# Patient Record
Sex: MALE | Race: BLACK OR AFRICAN AMERICAN | NOT HISPANIC OR LATINO | URBAN - METROPOLITAN AREA
[De-identification: names, ages, dates, MRNs, and addresses within clinical notes are randomized per-mention and may not be internally consistent; named-entity substitution may affect disease eponyms.]

---

## 2019-03-12 ENCOUNTER — EMERGENCY (EMERGENCY)
Facility: HOSPITAL | Age: 59
LOS: 1 days | Discharge: HOME | End: 2019-03-12
Attending: EMERGENCY MEDICINE | Admitting: EMERGENCY MEDICINE
Payer: COMMERCIAL

## 2019-03-12 VITALS
WEIGHT: 161.6 LBS | HEART RATE: 97 BPM | RESPIRATION RATE: 18 BRPM | SYSTOLIC BLOOD PRESSURE: 143 MMHG | OXYGEN SATURATION: 99 % | DIASTOLIC BLOOD PRESSURE: 97 MMHG | TEMPERATURE: 98 F

## 2019-03-12 VITALS
TEMPERATURE: 98 F | DIASTOLIC BLOOD PRESSURE: 75 MMHG | HEART RATE: 92 BPM | RESPIRATION RATE: 18 BRPM | SYSTOLIC BLOOD PRESSURE: 108 MMHG | OXYGEN SATURATION: 98 %

## 2019-03-12 DIAGNOSIS — E11.9 TYPE 2 DIABETES MELLITUS WITHOUT COMPLICATIONS: ICD-10-CM

## 2019-03-12 DIAGNOSIS — R79.89 OTHER SPECIFIED ABNORMAL FINDINGS OF BLOOD CHEMISTRY: ICD-10-CM

## 2019-03-12 DIAGNOSIS — E78.5 HYPERLIPIDEMIA, UNSPECIFIED: ICD-10-CM

## 2019-03-12 DIAGNOSIS — I10 ESSENTIAL (PRIMARY) HYPERTENSION: ICD-10-CM

## 2019-03-12 DIAGNOSIS — Z79.899 OTHER LONG TERM (CURRENT) DRUG THERAPY: ICD-10-CM

## 2019-03-12 DIAGNOSIS — Z71.1 PERSON WITH FEARED HEALTH COMPLAINT IN WHOM NO DIAGNOSIS IS MADE: ICD-10-CM

## 2019-03-12 LAB
ALBUMIN SERPL ELPH-MCNC: 4.8 G/DL — SIGNIFICANT CHANGE UP (ref 3.3–5)
ALP SERPL-CCNC: 37 U/L — LOW (ref 40–120)
ALT FLD-CCNC: 40 U/L — SIGNIFICANT CHANGE UP (ref 10–45)
ANION GAP SERPL CALC-SCNC: 11 MMOL/L — SIGNIFICANT CHANGE UP (ref 5–17)
APPEARANCE UR: CLEAR — SIGNIFICANT CHANGE UP
AST SERPL-CCNC: 28 U/L — SIGNIFICANT CHANGE UP (ref 10–40)
BASOPHILS # BLD AUTO: 0.02 K/UL — SIGNIFICANT CHANGE UP (ref 0–0.2)
BASOPHILS NFR BLD AUTO: 0.3 % — SIGNIFICANT CHANGE UP (ref 0–2)
BILIRUB SERPL-MCNC: 0.6 MG/DL — SIGNIFICANT CHANGE UP (ref 0.2–1.2)
BILIRUB UR-MCNC: NEGATIVE — SIGNIFICANT CHANGE UP
BUN SERPL-MCNC: 11 MG/DL — SIGNIFICANT CHANGE UP (ref 7–23)
CALCIUM SERPL-MCNC: 10.2 MG/DL — SIGNIFICANT CHANGE UP (ref 8.4–10.5)
CHLORIDE SERPL-SCNC: 102 MMOL/L — SIGNIFICANT CHANGE UP (ref 96–108)
CO2 SERPL-SCNC: 25 MMOL/L — SIGNIFICANT CHANGE UP (ref 22–31)
COLOR SPEC: YELLOW — SIGNIFICANT CHANGE UP
CREAT ?TM UR-MCNC: 151 MG/DL — SIGNIFICANT CHANGE UP
CREAT SERPL-MCNC: 1.1 MG/DL — SIGNIFICANT CHANGE UP (ref 0.5–1.3)
DIFF PNL FLD: NEGATIVE — SIGNIFICANT CHANGE UP
EOSINOPHIL # BLD AUTO: 0 K/UL — SIGNIFICANT CHANGE UP (ref 0–0.5)
EOSINOPHIL NFR BLD AUTO: 0 % — SIGNIFICANT CHANGE UP (ref 0–6)
GLUCOSE SERPL-MCNC: 115 MG/DL — HIGH (ref 70–99)
GLUCOSE UR QL: >=1000
HCT VFR BLD CALC: 50.8 % — HIGH (ref 39–50)
HGB BLD-MCNC: 17.4 G/DL — HIGH (ref 13–17)
IMM GRANULOCYTES NFR BLD AUTO: 0.3 % — SIGNIFICANT CHANGE UP (ref 0–1.5)
KETONES UR-MCNC: 15 MG/DL
LEUKOCYTE ESTERASE UR-ACNC: NEGATIVE — SIGNIFICANT CHANGE UP
LYMPHOCYTES # BLD AUTO: 1.36 K/UL — SIGNIFICANT CHANGE UP (ref 1–3.3)
LYMPHOCYTES # BLD AUTO: 17.5 % — SIGNIFICANT CHANGE UP (ref 13–44)
MAGNESIUM SERPL-MCNC: 2.3 MG/DL — SIGNIFICANT CHANGE UP (ref 1.6–2.6)
MCHC RBC-ENTMCNC: 30.5 PG — SIGNIFICANT CHANGE UP (ref 27–34)
MCHC RBC-ENTMCNC: 34.3 GM/DL — SIGNIFICANT CHANGE UP (ref 32–36)
MCV RBC AUTO: 89 FL — SIGNIFICANT CHANGE UP (ref 80–100)
MONOCYTES # BLD AUTO: 0.5 K/UL — SIGNIFICANT CHANGE UP (ref 0–0.9)
MONOCYTES NFR BLD AUTO: 6.5 % — SIGNIFICANT CHANGE UP (ref 2–14)
NEUTROPHILS # BLD AUTO: 5.85 K/UL — SIGNIFICANT CHANGE UP (ref 1.8–7.4)
NEUTROPHILS NFR BLD AUTO: 75.4 % — SIGNIFICANT CHANGE UP (ref 43–77)
NITRITE UR-MCNC: NEGATIVE — SIGNIFICANT CHANGE UP
NRBC # BLD: 0 /100 WBCS — SIGNIFICANT CHANGE UP (ref 0–0)
PH UR: 7 — SIGNIFICANT CHANGE UP (ref 5–8)
PLATELET # BLD AUTO: 168 K/UL — SIGNIFICANT CHANGE UP (ref 150–400)
POTASSIUM SERPL-MCNC: 4.7 MMOL/L — SIGNIFICANT CHANGE UP (ref 3.5–5.3)
POTASSIUM SERPL-SCNC: 4.7 MMOL/L — SIGNIFICANT CHANGE UP (ref 3.5–5.3)
PROT SERPL-MCNC: 8.3 G/DL — SIGNIFICANT CHANGE UP (ref 6–8.3)
PROT UR-MCNC: NEGATIVE MG/DL — SIGNIFICANT CHANGE UP
RBC # BLD: 5.71 M/UL — SIGNIFICANT CHANGE UP (ref 4.2–5.8)
RBC # FLD: 14.2 % — SIGNIFICANT CHANGE UP (ref 10.3–14.5)
SODIUM SERPL-SCNC: 138 MMOL/L — SIGNIFICANT CHANGE UP (ref 135–145)
SODIUM UR-SCNC: 75 MMOL/L — SIGNIFICANT CHANGE UP
SP GR SPEC: 1.02 — SIGNIFICANT CHANGE UP (ref 1–1.03)
UROBILINOGEN FLD QL: 0.2 E.U./DL — SIGNIFICANT CHANGE UP
WBC # BLD: 7.75 K/UL — SIGNIFICANT CHANGE UP (ref 3.8–10.5)
WBC # FLD AUTO: 7.75 K/UL — SIGNIFICANT CHANGE UP (ref 3.8–10.5)

## 2019-03-12 PROCEDURE — 84300 ASSAY OF URINE SODIUM: CPT

## 2019-03-12 PROCEDURE — 81003 URINALYSIS AUTO W/O SCOPE: CPT

## 2019-03-12 PROCEDURE — 85025 COMPLETE CBC W/AUTO DIFF WBC: CPT

## 2019-03-12 PROCEDURE — 99283 EMERGENCY DEPT VISIT LOW MDM: CPT | Mod: 25

## 2019-03-12 PROCEDURE — 83735 ASSAY OF MAGNESIUM: CPT

## 2019-03-12 PROCEDURE — 82570 ASSAY OF URINE CREATININE: CPT

## 2019-03-12 PROCEDURE — 80053 COMPREHEN METABOLIC PANEL: CPT

## 2019-03-12 PROCEDURE — 93010 ELECTROCARDIOGRAM REPORT: CPT

## 2019-03-12 PROCEDURE — 93005 ELECTROCARDIOGRAM TRACING: CPT

## 2019-03-12 RX ORDER — ATORVASTATIN CALCIUM 80 MG/1
0 TABLET, FILM COATED ORAL
Qty: 0 | Refills: 0 | COMMUNITY

## 2019-03-12 RX ORDER — AMLODIPINE BESYLATE 2.5 MG/1
0 TABLET ORAL
Qty: 0 | Refills: 0 | COMMUNITY

## 2019-03-12 RX ORDER — DAPAGLIFLOZIN 10 MG/1
0 TABLET, FILM COATED ORAL
Qty: 0 | Refills: 0 | COMMUNITY

## 2019-03-12 NOTE — ED ADULT NURSE NOTE - CHPI ED NUR SYMPTOMS NEG
no tingling/no pain/no dizziness/no chills/no fever/no nausea/no decreased eating/drinking/no vomiting/no weakness

## 2019-03-12 NOTE — ED ADULT NURSE NOTE - NSIMPLEMENTINTERV_GEN_ALL_ED
Implemented All Universal Safety Interventions:  [place] to call system. Call bell, personal items and telephone within reach. Instruct patient to call for assistance. Room bathroom lighting operational. Non-slip footwear when patient is off stretcher. Physically safe environment: no spills, clutter or unnecessary equipment. Stretcher in lowest position, wheels locked, appropriate side rails in place.

## 2019-03-12 NOTE — ED ADULT NURSE NOTE - OBJECTIVE STATEMENT
Pt w/ PMH of DM, HTN and HLD presents to ED today on referral of PMD for reported low lab values.  Pt denies dizziness, HA, numbness/paresthesias, LOC, CP, SOB or tremors.  Pt ECG shows NSR.  Pt is on CCM pending labs.

## 2019-03-12 NOTE — ED PROVIDER NOTE - CLINICAL SUMMARY MEDICAL DECISION MAKING FREE TEXT BOX
sent in for [facility] outpatient labs with Na <115, K<2, Cl<80. pt asymptomatic completely, labs were routine for his DM. repeat here normal. updated his pcp dr maharaj and dr davalos

## 2019-03-12 NOTE — ED ADULT TRIAGE NOTE - OTHER COMPLAINTS
as per labs k <2 and  . patient denies any complaints. sent in to be admitted to [facility] service.

## 2021-02-16 PROBLEM — I10 ESSENTIAL (PRIMARY) HYPERTENSION: Chronic | Status: ACTIVE | Noted: 2019-03-12

## 2021-02-16 PROBLEM — E11.9 TYPE 2 DIABETES MELLITUS WITHOUT COMPLICATIONS: Chronic | Status: ACTIVE | Noted: 2019-03-12

## 2021-02-16 PROBLEM — E78.5 HYPERLIPIDEMIA, UNSPECIFIED: Chronic | Status: ACTIVE | Noted: 2019-03-12

## 2021-03-10 PROBLEM — Z00.00 ENCOUNTER FOR PREVENTIVE HEALTH EXAMINATION: Status: ACTIVE | Noted: 2021-03-10

## 2021-03-17 ENCOUNTER — APPOINTMENT (OUTPATIENT)
Dept: HEMATOLOGY ONCOLOGY | Facility: CLINIC | Age: 61
End: 2021-03-17
Payer: COMMERCIAL

## 2021-03-17 VITALS
WEIGHT: 156.13 LBS | SYSTOLIC BLOOD PRESSURE: 144 MMHG | DIASTOLIC BLOOD PRESSURE: 94 MMHG | TEMPERATURE: 97.3 F | HEIGHT: 64 IN | BODY MASS INDEX: 26.66 KG/M2 | HEART RATE: 104 BPM | OXYGEN SATURATION: 96 %

## 2021-03-17 DIAGNOSIS — Z80.6 FAMILY HISTORY OF LEUKEMIA: ICD-10-CM

## 2021-03-17 DIAGNOSIS — Z82.49 FAMILY HISTORY OF ISCHEMIC HEART DISEASE AND OTHER DISEASES OF THE CIRCULATORY SYSTEM: ICD-10-CM

## 2021-03-17 DIAGNOSIS — Z80.3 FAMILY HISTORY OF MALIGNANT NEOPLASM OF BREAST: ICD-10-CM

## 2021-03-17 DIAGNOSIS — Z83.3 FAMILY HISTORY OF DIABETES MELLITUS: ICD-10-CM

## 2021-03-17 PROCEDURE — 99072 ADDL SUPL MATRL&STAF TM PHE: CPT

## 2021-03-17 PROCEDURE — 99204 OFFICE O/P NEW MOD 45 MIN: CPT | Mod: 25

## 2021-03-17 NOTE — ASSESSMENT
[FreeTextEntry1] : We will do repeat blood work including JAK2 to rule out polycythemia vera.\par \par \par Patient referred to our endocrinologist Dr. Monica Dixon for better control of diabetes.\par \par \par Follow-up here in 2 months or sooner if needed.\par \par \par

## 2021-03-17 NOTE — HISTORY OF PRESENT ILLNESS
[de-identified] : 60 years old male with history of diabetes x10 years was found to have an elevated hemoglobin hematocrit... He has been seen by neurologist Dr. Vargas for tremor... Most recent blood work, reveals his diabetes to be poorly controlled with hemoglobin A1c of 8.6 g/dL... I therefore suspect the polycythemia is secondary to dehydration from poorly controlled diabetes.

## 2021-03-18 LAB
25(OH)D3 SERPL-MCNC: 26.9 NG/ML
ALBUMIN SERPL ELPH-MCNC: 4.3 G/DL
ALP BLD-CCNC: 48 U/L
ALT SERPL-CCNC: 38 U/L
ANION GAP SERPL CALC-SCNC: 11 MMOL/L
AST SERPL-CCNC: 23 U/L
BASOPHILS # BLD AUTO: 0.03 K/UL
BASOPHILS NFR BLD AUTO: 0.6 %
BILIRUB SERPL-MCNC: 0.3 MG/DL
BUN SERPL-MCNC: 15 MG/DL
CALCIUM SERPL-MCNC: 9.6 MG/DL
CHLORIDE SERPL-SCNC: 101 MMOL/L
CO2 SERPL-SCNC: 24 MMOL/L
CREAT SERPL-MCNC: 1.07 MG/DL
EOSINOPHIL # BLD AUTO: 0.01 K/UL
EOSINOPHIL NFR BLD AUTO: 0.2 %
GLUCOSE SERPL-MCNC: 287 MG/DL
HCT VFR BLD CALC: 51.3 %
HGB BLD-MCNC: 16.6 G/DL
IMM GRANULOCYTES NFR BLD AUTO: 0.2 %
LDH SERPL-CCNC: 165 U/L
LYMPHOCYTES # BLD AUTO: 1.12 K/UL
LYMPHOCYTES NFR BLD AUTO: 21.9 %
MAN DIFF?: NORMAL
MCHC RBC-ENTMCNC: 29 PG
MCHC RBC-ENTMCNC: 32.4 GM/DL
MCV RBC AUTO: 89.7 FL
MONOCYTES # BLD AUTO: 0.32 K/UL
MONOCYTES NFR BLD AUTO: 6.3 %
NEUTROPHILS # BLD AUTO: 3.62 K/UL
NEUTROPHILS NFR BLD AUTO: 70.8 %
PLATELET # BLD AUTO: 136 K/UL
POTASSIUM SERPL-SCNC: 4.3 MMOL/L
PROT SERPL-MCNC: 6.9 G/DL
RBC # BLD: 5.72 M/UL
RBC # FLD: 14.4 %
SODIUM SERPL-SCNC: 136 MMOL/L
TSH SERPL-ACNC: 1.91 UIU/ML
VIT B12 SERPL-MCNC: 1453 PG/ML
WBC # FLD AUTO: 5.11 K/UL

## 2021-03-22 LAB
ESTIMATED AVERAGE GLUCOSE: 206 MG/DL
HBA1C MFR BLD HPLC: 8.8 %

## 2021-03-26 ENCOUNTER — APPOINTMENT (OUTPATIENT)
Dept: ENDOCRINOLOGY | Facility: CLINIC | Age: 61
End: 2021-03-26
Payer: COMMERCIAL

## 2021-03-26 VITALS
BODY MASS INDEX: 26.46 KG/M2 | HEART RATE: 96 BPM | OXYGEN SATURATION: 96 % | TEMPERATURE: 96.5 F | HEIGHT: 64 IN | WEIGHT: 155 LBS | DIASTOLIC BLOOD PRESSURE: 93 MMHG | SYSTOLIC BLOOD PRESSURE: 149 MMHG

## 2021-03-26 PROCEDURE — 99072 ADDL SUPL MATRL&STAF TM PHE: CPT

## 2021-03-26 PROCEDURE — 99205 OFFICE O/P NEW HI 60 MIN: CPT

## 2021-03-26 RX ORDER — ALCOHOL ANTISEPTIC PADS
PADS, MEDICATED (EA) TOPICAL
Qty: 1 | Refills: 0 | Status: ACTIVE | COMMUNITY
Start: 2021-03-26 | End: 1900-01-01

## 2021-03-26 RX ORDER — BLOOD-GLUCOSE METER
W/DEVICE KIT MISCELLANEOUS
Qty: 1 | Refills: 0 | Status: ACTIVE | COMMUNITY
Start: 2021-03-26 | End: 1900-01-01

## 2021-03-26 NOTE — REVIEW OF SYSTEMS
[Depression] : depression [Stress] : stress [Fatigue] : no fatigue [Decreased Appetite] : appetite not decreased [Dysphagia] : no dysphagia [Dysphonia] : no dysphonia [Chest Pain] : no chest pain [Slow Heart Rate] : heart rate is not slow [Palpitations] : no palpitations [Fast Heart Rate] : heart rate is not fast [Shortness Of Breath] : no shortness of breath [Cough] : no cough [Nausea] : no nausea [Constipation] : no constipation [Vomiting] : no vomiting [Diarrhea] : no diarrhea [Headaches] : no headaches [Tremors] : no tremors [Cold Intolerance] : no cold intolerance [Heat Intolerance] : no heat intolerance [Easy Bruising] : no tendency for easy bruising

## 2021-03-26 NOTE — REASON FOR VISIT
[Initial Evaluation] : an initial evaluation [DM Type 2] : DM Type 2 [FreeTextEntry2] : Dr/. Audrey Fuentes

## 2021-03-26 NOTE — ASSESSMENT
[Diabetes Foot Care] : diabetes foot care [Long Term Vascular Complications] : long term vascular complications of diabetes [Carbohydrate Consistent Diet] : carbohydrate consistent diet [Importance of Diet and Exercise] : importance of diet and exercise to improve glycemic control, achieve weight loss and improve cardiovascular health [Exercise/Effect on Glucose] : exercise/effect on glucose [Self Monitoring of Blood Glucose] : self monitoring of blood glucose [Retinopathy Screening] : Patient was referred to ophthalmology for retinopathy screening [Hypoglycemia Management] : hypoglycemia management [Action and use of Insulin] : action and use of short and long-acting insulin [Injection Technique, Storage, Sharps Disposal] : injection technique, storage, and sharps disposal [FreeTextEntry1] : Patient is a 59 yo man with uncontrolled type 2 diabetes\par \par 1. Type 2 diabetes\par -patient with A1c above goal at 8.8%.  Goal A1c is 7%\par -based on food recall, diet is reasonable. He has healthy carbs, does not eat fast food, denies having a sweet tooth and is normal weight.\par -being on three oral medicines with intolerance to metformin + a relatively healthy diet without being at goal A1c warrants small dose of basal insulin\par -start Lantus 15 units once a day\par -restart SMBG, glycemic goals are 100-140's\par -patient to call if sugars drop to < 70\par -continue with oral medicines.  Side effects of Farxiga including euglycemic acidosis and infections discussed, GI side effects of januvia and hypoglycemia with glipizide also discussed\par -significant amount of time spent discussing importance of behavioral modifications\par -healthy plate diet, consistent carbohydrate and sugar limited diet discussed\par -up to date with dilated eye exam\par -check microalbumin/creatinine today\par -monofilament testing normal\par \par 2. HLD\par -no lipid profile available\par -check lipid profile\par -continue statin\par \par 3. HTN\par -BP goal < 140/90\par -to be managed by PCP\par -cardiovascular risk factors were discussed including heart attack and stroke. Currently has no chest pain but if he experiences chest pain/shoulder/arm pain, he must seek further medical care\par \par 4. Right thyroid nodule\par -thyroid US referral provided\par \par Follow up in 2-3 months. Call with hypo/hyperglycemic excursions

## 2021-03-26 NOTE — HISTORY OF PRESENT ILLNESS
[FreeTextEntry1] : Patient is a 61 yo man with uncontrolled type 2 diabetes, polycythemia here for endcorine consultation\par \par Type 2 diabetes diagnosed about 2010.  States he needs to change diet drastically and started to do that recently.  PCP was managing the diabetes, never had an endocrinologist before.\par Current medications: januvia 100 mg, glipizide 10 mg and farxiga 10 mg. States adherence to medicines. Farxiga is the "newest" agent and has been on it for about 5 years.\par Was previously on metformin but had an allergic-like reaction.  Metformin was stopped 6 years ago.\par Has a glucometer, knows how to check but does not check regularly.  Started to track about 1 year ago but stopped\par Breakfast: whole wheat bread, 1 banana; coffee with milk no sugar\par Lunch: salad from [employer], slice of bread.  Used to eat plantain but stopped.  Denies eating fast food, home cooked meals\par Dinner 5-6 PM: quinoa, couscous, beans, chicken, broccoli\par Sweet tooth: denies having a sweet tooth but has one fruit daily\par Does not drink juice and no soda. If he drinks juice, it is home made from fresh fruit\par Dilated eye exam: 6 months ago, pressure was high\par Serum A1c 8.8% (March 22, 2021)\par \par HLD: on statin\par \par HTN: Denies having BP in the past but was told recently pressure was high\par

## 2021-03-26 NOTE — PHYSICAL EXAM
[Alert] : alert [Well Nourished] : well nourished [No Acute Distress] : no acute distress [EOMI] : extra ocular movement intact [Normal Hearing] : hearing was normal [Thyroid Not Enlarged] : the thyroid was not enlarged [Normal S1, S2] : normal S1 and S2 [Normal Rate] : heart rate was normal [Normal Bowel Sounds] : normal bowel sounds [Soft] : abdomen soft [Healthy Appearance] : healthy appearance [No Respiratory Distress] : no respiratory distress [No Accessory Muscle Use] : no accessory muscle use [Clear to Auscultation] : lungs were clear to auscultation bilaterally [Normal Gait] : normal gait [Normal Strength/Tone] : muscle strength and tone were normal [No Rash] : no rash [Foot Ulcers] : no foot ulcers [Right Foot Was Examined] : right foot ~C was examined [Left Foot Was Examined] : left foot ~C was examined [Normal] : normal [Full ROM] : with full range of motion [Swelling] : not swollen [Tenderness] : not tender [Erythema] : not erythematous [2+] : 2+ in the dorsalis pedis [#1 Diminished] : number 1 was normal [#2 Diminished] : number 2 was normal [#3 Diminished] : number 3 was normal [#4 Diminished] : number 4 was normal [#5 Diminished] : number 5 was normal [#6 Diminished] : number 6 was normal [#7 Diminished] : number 7 was normal [#8 Diminished] : number 8 was normal [#9 Diminished] : number 9 was normal [#10 Diminished] : number 10 was normal [No Motor Deficits] : the motor exam was normal [Normal Reflexes] : deep tendon reflexes were 2+ and symmetric [No Tremors] : no tremors [Normal Affect] : the affect was normal [Normal Insight/Judgement] : insight and judgment were intact [Normal Mood] : the mood was normal [de-identified] : right thyroid nodule

## 2021-03-29 LAB
CHOLEST SERPL-MCNC: 143 MG/DL
CREAT SPEC-SCNC: 63 MG/DL
HDLC SERPL-MCNC: 36 MG/DL
JAK2RLX: NORMAL
LDLC SERPL CALC-MCNC: 90 MG/DL
MICROALBUMIN 24H UR DL<=1MG/L-MCNC: <1.2 MG/DL
MICROALBUMIN/CREAT 24H UR-RTO: NORMAL MG/G
NONHDLC SERPL-MCNC: 107 MG/DL
TRIGL SERPL-MCNC: 83 MG/DL

## 2021-04-21 ENCOUNTER — TRANSCRIPTION ENCOUNTER (OUTPATIENT)
Age: 61
End: 2021-04-21

## 2021-04-22 ENCOUNTER — APPOINTMENT (OUTPATIENT)
Dept: HEMATOLOGY ONCOLOGY | Facility: CLINIC | Age: 61
End: 2021-04-22
Payer: COMMERCIAL

## 2021-04-22 PROCEDURE — 99213 OFFICE O/P EST LOW 20 MIN: CPT | Mod: 95

## 2021-04-22 NOTE — ASSESSMENT
[FreeTextEntry1] : Patient was polycythemia secondary to dehydration... He is now working on getting his diabetes under better control, with our endocrinologist.\par \par I explained to patient that his polycythemia was secondary to dehydration not the primary process... He will follow-up with his new primary Dr. Gooden in May.\par \par Follow-up here as needed

## 2021-04-22 NOTE — CONSULT LETTER
[Dear  ___] : Dear  [unfilled], [Consult Letter:] : I had the pleasure of evaluating your patient, [unfilled]. [Please see my note below.] : Please see my note below. [Consult Closing:] : Thank you very much for allowing me to participate in the care of this patient.  If you have any questions, please do not hesitate to contact me. [Sincerely,] : Sincerely, [FreeTextEntry3] : Audrey Fuentes MD\par

## 2021-04-22 NOTE — HISTORY OF PRESENT ILLNESS
[Home] : at home, [unfilled] , at the time of the visit. [Other Location: e.g. Home (Enter Location, City,State)___] : at [unfilled] [Verbal consent obtained from patient] : the patient, [unfilled] [de-identified] : 60 years old male with history of diabetes x10 years was found to have an elevated hemoglobin hematocrit... He has been seen by neurologist Dr. Vargas for tremor... Most recent blood work, reveals his diabetes to be poorly controlled with hemoglobin A1c of 8.6 g/dL... I therefore suspect the polycythemia is secondary to dehydration from poorly controlled diabetes. [de-identified] : April 22, 2021 patient is seen in follow-up for polycythemia... His JAK2 was negative, and those results were communicated to patient... He is very happy with Dr. Dixon, and is working on getting his blood sugar under control

## 2021-05-05 ENCOUNTER — NON-APPOINTMENT (OUTPATIENT)
Age: 61
End: 2021-05-05

## 2021-05-12 ENCOUNTER — APPOINTMENT (OUTPATIENT)
Dept: INTERNAL MEDICINE | Facility: CLINIC | Age: 61
End: 2021-05-12
Payer: COMMERCIAL

## 2021-05-12 VITALS
HEART RATE: 77 BPM | WEIGHT: 150 LBS | SYSTOLIC BLOOD PRESSURE: 130 MMHG | BODY MASS INDEX: 25.61 KG/M2 | DIASTOLIC BLOOD PRESSURE: 80 MMHG | HEIGHT: 64.25 IN

## 2021-05-12 DIAGNOSIS — Z98.890 OTHER SPECIFIED POSTPROCEDURAL STATES: ICD-10-CM

## 2021-05-12 DIAGNOSIS — H53.9 UNSPECIFIED VISUAL DISTURBANCE: ICD-10-CM

## 2021-05-12 DIAGNOSIS — R25.3 FASCICULATION: ICD-10-CM

## 2021-05-12 DIAGNOSIS — D75.1 SECONDARY POLYCYTHEMIA: ICD-10-CM

## 2021-05-12 DIAGNOSIS — Z63.5 DISRUPTION OF FAMILY BY SEPARATION AND DIVORCE: ICD-10-CM

## 2021-05-12 PROCEDURE — 99072 ADDL SUPL MATRL&STAF TM PHE: CPT

## 2021-05-12 PROCEDURE — 99204 OFFICE O/P NEW MOD 45 MIN: CPT

## 2021-05-12 SDOH — SOCIAL STABILITY - SOCIAL INSECURITY: DISRUPTION OF FAMILY BY SEPARATION AND DIVORCE: Z63.5

## 2021-05-12 NOTE — PLAN
[FreeTextEntry1] : f/u with Endocrinology in 2 weeks and get labs then when on optimal period to assess diets effect on labs. Has already assisted in his weight loss and normalization of blood pressure. Asked that they add PSA to those bloods

## 2021-05-12 NOTE — PHYSICAL EXAM
[Well-Appearing] : well-appearing [No Respiratory Distress] : no respiratory distress  [Clear to Auscultation] : lungs were clear to auscultation bilaterally [Normal Rate] : normal rate  [Regular Rhythm] : with a regular rhythm [Normal S1, S2] : normal S1 and S2 [No Murmur] : no murmur heard [No Edema] : there was no peripheral edema [Soft] : abdomen soft [Non Tender] : non-tender [Non-distended] : non-distended [No HSM] : no HSM [Normal Bowel Sounds] : normal bowel sounds [No Hernias] : no hernias [No Spinal Tenderness] : no spinal tenderness [Grossly Normal Strength/Tone] : grossly normal strength/tone [No Skin Lesions] : no skin lesions [Normal] : affect was normal and insight and judgment were intact [de-identified] : fit looking; well groomed [de-identified] : cloudy over right lens; left clear [de-identified] : palpable nodule to right of thyroid midline

## 2021-05-12 NOTE — HISTORY OF PRESENT ILLNESS
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 . His medical hx is detailed below. Notably, he was diagnosed with DM in 2013. Despite 3 oral meds, A1c remained elevated. Since his last lab test on 3.26.21, when his A1c was 8.8, he started a no animal/no animal product diet. He feels healthier and notes frequent urination in the past has decreased. The diet + exercise have resulted in a 6 lb weight loss in the last 6 weeks.

## 2021-05-27 ENCOUNTER — APPOINTMENT (OUTPATIENT)
Dept: ENDOCRINOLOGY | Facility: CLINIC | Age: 61
End: 2021-05-27
Payer: COMMERCIAL

## 2021-05-27 VITALS
BODY MASS INDEX: 25.27 KG/M2 | DIASTOLIC BLOOD PRESSURE: 90 MMHG | HEART RATE: 98 BPM | TEMPERATURE: 96.5 F | HEIGHT: 64 IN | OXYGEN SATURATION: 97 % | SYSTOLIC BLOOD PRESSURE: 131 MMHG | WEIGHT: 148 LBS

## 2021-05-27 LAB — HBA1C MFR BLD HPLC: 7.4

## 2021-05-27 PROCEDURE — 99214 OFFICE O/P EST MOD 30 MIN: CPT | Mod: 25

## 2021-05-27 PROCEDURE — 83036 HEMOGLOBIN GLYCOSYLATED A1C: CPT | Mod: QW

## 2021-06-21 ENCOUNTER — TRANSCRIPTION ENCOUNTER (OUTPATIENT)
Age: 61
End: 2021-06-21

## 2021-06-21 RX ORDER — BLOOD-GLUCOSE METER
70 EACH MISCELLANEOUS
Qty: 1 | Refills: 3 | Status: ACTIVE | COMMUNITY
Start: 2021-03-26 | End: 1900-01-01

## 2021-06-22 RX ORDER — DAPAGLIFLOZIN 10 MG/1
10 TABLET, FILM COATED ORAL DAILY
Qty: 90 | Refills: 3 | Status: ACTIVE | COMMUNITY
Start: 1900-01-01 | End: 1900-01-01

## 2021-07-01 ENCOUNTER — RX RENEWAL (OUTPATIENT)
Age: 61
End: 2021-07-01

## 2021-08-05 ENCOUNTER — TRANSCRIPTION ENCOUNTER (OUTPATIENT)
Age: 61
End: 2021-08-05

## 2021-08-19 ENCOUNTER — APPOINTMENT (OUTPATIENT)
Dept: ENDOCRINOLOGY | Facility: CLINIC | Age: 61
End: 2021-08-19
Payer: COMMERCIAL

## 2021-08-19 VITALS
BODY MASS INDEX: 25.95 KG/M2 | WEIGHT: 152 LBS | HEIGHT: 64 IN | DIASTOLIC BLOOD PRESSURE: 95 MMHG | SYSTOLIC BLOOD PRESSURE: 135 MMHG | TEMPERATURE: 97.2 F | OXYGEN SATURATION: 98 % | HEART RATE: 91 BPM

## 2021-08-19 LAB — HBA1C MFR BLD HPLC: 7.7

## 2021-08-19 PROCEDURE — 99214 OFFICE O/P EST MOD 30 MIN: CPT | Mod: 25

## 2021-08-19 PROCEDURE — 83036 HEMOGLOBIN GLYCOSYLATED A1C: CPT | Mod: QW

## 2021-08-19 RX ORDER — INSULIN GLARGINE 100 [IU]/ML
100 INJECTION, SOLUTION SUBCUTANEOUS
Qty: 2 | Refills: 3 | Status: DISCONTINUED | COMMUNITY
Start: 2021-03-26 | End: 2021-08-19

## 2021-08-19 NOTE — ASSESSMENT
[Long Term Vascular Complications] : long term vascular complications of diabetes [Carbohydrate Consistent Diet] : carbohydrate consistent diet [Importance of Diet and Exercise] : importance of diet and exercise to improve glycemic control, achieve weight loss and improve cardiovascular health [Exercise/Effect on Glucose] : exercise/effect on glucose [Hypoglycemia Management] : hypoglycemia management [Action and use of Insulin] : action and use of short and long-acting insulin [Self Monitoring of Blood Glucose] : self monitoring of blood glucose [Injection Technique, Storage, Sharps Disposal] : injection technique, storage, and sharps disposal [Retinopathy Screening] : Patient was referred to ophthalmology for retinopathy screening [FreeTextEntry1] : Patient is a 62 yo man with type 2 diabetes, bilateral thyroid nodules, HTN and HLD here for follow up\par \par 1. Type 2 diabetes\par -POCT A1c today is 7.4% which is a nice improvement from the last visit. \par -he never started the basal insulin\par -currently on 3 oral medicines, farxiga, glipizide and januvia.  Tolerating all medicines without reported side effects\par -up to date with dilated eye exam\par -continue with consistent carbohydrate, sugar limited diet\par -A1c goal 7%\par -if frequent hypoglycemia to < 70, decrease glipizide. Patient will call.\par \par 2. Thyroid nodule\par -right dominant nodule is 1.9 center\par -no baseline thyroid US for comaprison\par -repeat thyroid US Winter 2021 and if nodules increase in size may meet criteria for USGFNA\par -chemically euthyroid\par -no compressive symptoms\par \par 3. HTN\par -BP goal < 140/90\par \par 4. HLD\par -LDL 90 March 2021\par -on statin\par \par Follow up in 3-4 months

## 2021-08-19 NOTE — PHYSICAL EXAM
[Alert] : alert [Well Nourished] : well nourished [Healthy Appearance] : healthy appearance [No Acute Distress] : no acute distress [EOMI] : extra ocular movement intact [Normal Hearing] : hearing was normal [Thyroid Not Enlarged] : the thyroid was not enlarged [No Respiratory Distress] : no respiratory distress [No Accessory Muscle Use] : no accessory muscle use [Clear to Auscultation] : lungs were clear to auscultation bilaterally [Normal S1, S2] : normal S1 and S2 [Normal Rate] : heart rate was normal [Normal Bowel Sounds] : normal bowel sounds [Soft] : abdomen soft [Normal Gait] : normal gait [Normal Strength/Tone] : muscle strength and tone were normal [No Rash] : no rash [Foot Ulcers] : no foot ulcers [Right Foot Was Examined] : right foot ~C was examined [Left Foot Was Examined] : left foot ~C was examined [Normal] : normal [Full ROM] : with full range of motion [Swelling] : not swollen [Tenderness] : not tender [Erythema] : not erythematous [2+] : 2+ in the dorsalis pedis [#1 Diminished] : number 1 was normal [#2 Diminished] : number 2 was normal [#3 Diminished] : number 3 was normal [#4 Diminished] : number 4 was normal [#5 Diminished] : number 5 was normal [#6 Diminished] : number 6 was normal [#7 Diminished] : number 7 was normal [#8 Diminished] : number 8 was normal [#9 Diminished] : number 9 was normal [#10 Diminished] : number 10 was normal [No Motor Deficits] : the motor exam was normal [Normal Reflexes] : deep tendon reflexes were 2+ and symmetric [No Tremors] : no tremors [Normal Affect] : the affect was normal [Normal Insight/Judgement] : insight and judgment were intact [Normal Mood] : the mood was normal [de-identified] : right thyroid nodule

## 2021-08-19 NOTE — PHYSICAL EXAM
[Alert] : alert [Well Nourished] : well nourished [Healthy Appearance] : healthy appearance [No Acute Distress] : no acute distress [EOMI] : extra ocular movement intact [Normal Hearing] : hearing was normal [Thyroid Not Enlarged] : the thyroid was not enlarged [No Respiratory Distress] : no respiratory distress [No Accessory Muscle Use] : no accessory muscle use [Clear to Auscultation] : lungs were clear to auscultation bilaterally [Normal S1, S2] : normal S1 and S2 [Normal Rate] : heart rate was normal [Normal Bowel Sounds] : normal bowel sounds [Soft] : abdomen soft [No Stigmata of Cushings Syndrome] : no stigmata of Cushings Syndrome [Normal Gait] : normal gait [Normal Strength/Tone] : muscle strength and tone were normal [No Rash] : no rash [No Motor Deficits] : the motor exam was normal [Normal Affect] : the affect was normal [Normal Mood] : the mood was normal [Normal Insight/Judgement] : insight and judgment were intact [Foot Ulcers] : no foot ulcers

## 2021-08-19 NOTE — HISTORY OF PRESENT ILLNESS
[FreeTextEntry1] : Patient is a 62 yo man with uncontrolled type 2 diabetes, thyroid nodule here for diabetes follow up\par \par Type 2 diabetes diagnosed about 2010. States he needs to change diet drastically and started to do that recently.\par Was previously on metformin but had an allergic-like reaction. Metformin was stopped 6 years ago.\par He was on januvia 100 mg, glipizide 10 mg and farxiga 10 mg but based on an A1c of 8.8%, lantus 15 units was recommended in March 2021 but he never started the insulin, opting to modify diet instead. \par Currently adheres to glipizide, farxiga and januvia \par SMBG every other day\par AM: 90-100s\par PM: 100-130s\par Had value of 67-79 3 weeks ago in the morning\par Since the last visit, patient changed to plant based diet\par Breakfast: whole wheat bread, 1 banana; coffee with milk no sugar\par Lunch: salad from [employer], slice of bread. Used to eat plantain but stopped. Beans, chick peas and beans\par Dinner 5-6 PM: quinoa, couscous, beans, chicken, broccoli\par Does not drink juice and no soda. If he drinks juice, it is home made from fresh fruit\par Reports that he has been snacking on a lot of cashews\par Attempts to adhere to a plant based diet\par Dilated eye exam: 1 month ago, pressure in the eyes has improved. \par May A1c was 7.4% which was improved from 8.8%\par \par Thyroid nodule\par Right spongiform nodules measuring about 1.9 cm; left subcentimeter nodule April 2021\par No compressive symptoms\par Chemically and clinically euthyroid\par \par HLD: on statin\par \par HTN: Denies having BP in the past but was told recently pressure was high

## 2021-08-19 NOTE — HISTORY OF PRESENT ILLNESS
[FreeTextEntry1] : Patient is a 60 yo man with uncontrolled type 2 diabetes, polycythemia here for diabetes follow up\par \par Type 2 diabetes diagnosed about 2010. States he needs to change diet drastically and started to do that recently. PCP was managing the diabetes, never had an endocrinologist before.\par He was on januvia 100 mg, glipizide 10 mg and farxiga 10 mg but based on an A1c of 8.8%, lantus 15 units was recommended in March 2021 but he never started the  insulin, opting to modify diet instead. \par Was previously on metformin but had an allergic-like reaction. Metformin was stopped 6 years ago.\par Has a glucometer, knows how to check but does not check regularly. Patient states since last visit, he feels well.  No major issues. \par SMBG every other day\par AM: 90-100s\par PM: 100-130s\par Had value of 67-79 3 weeks ago in the morning\par Since the last visit, patient changed to plant based diet\par Breakfast: whole wheat bread, 1 banana; coffee with milk no sugar\par Lunch: salad from [employer], slice of bread. Used to eat plantain but stopped. Denies eating fast food, home cooked meals\par Dinner 5-6 PM: quinoa, couscous, beans, chicken, broccoli\par Sweet tooth: denies having a sweet tooth but has one fruit daily\par Does not drink juice and no soda. If he drinks juice, it is home made from fresh fruit\par Dilated eye exam: 6 months ago, pressure was high\par Serum A1c 8.8% (March 22, 2021)\par \par Thyroid nodule\par Right spongiform nodules measuring about 1.9 cm; left subcentimeter nodule\par No compressive symptoms\par Chemically and clinically euthyroid\par \par HLD: on statin\par \par HTN: Denies having BP in the past but was told recently pressure was high

## 2021-08-19 NOTE — REVIEW OF SYSTEMS
[Fatigue] : no fatigue [Decreased Appetite] : appetite not decreased [Visual Field Defect] : no visual field defect [Dysphagia] : no dysphagia [Neck Pain] : no neck pain [Dysphonia] : no dysphonia [Nasal Congestion] : no nasal congestion [Chest Pain] : no chest pain [Slow Heart Rate] : heart rate is not slow [Palpitations] : no palpitations [Nausea] : no nausea [Constipation] : no constipation [Vomiting] : no vomiting [Diarrhea] : no diarrhea

## 2021-08-19 NOTE — ASSESSMENT
[FreeTextEntry1] : Patient is a 60 yo man with type 2 diabetes, bilateral thyroid nodules, HTN and HLD here for follow up\par \par 1. Type 2 diabetes\par -POCT A1c today is 7.7% which is higher than previous visit and above goal\par -he believes the elevated A1c is due to extra snacking on nuts and will cut down\par -currently on 3 oral medicines, farxiga, glipizide and januvia. Tolerating all medicines without reported side effects. In order to obtain better glycemic control, will need additional medicines. He's already on three classes and is amenable to a trial of metformin. He has had some GI discomfort in the past with it but is open to retrial. Start with metformin 1000 mg daily, if no GI symptoms, increase to 1000 mg BID\par -up to date with dilated eye exam\par -continue with consistent carbohydrate, sugar limited diet\par -A1c goal 7%\par -if frequent hypoglycemia to < 70, decrease glipizide. Patient will call.\par \par 2. Thyroid nodule\par -right dominant nodule is 1.9 center\par -repeat thyroid US Winter 2021 and if nodules increase in size may meet criteria for USGFNA\par -chemically euthyroid\par -no compressive symptoms\par \par 3. HTN\par -BP goal < 140/90\par \par 4. HLD\par -LDL 90 March 2021\par -on statin\par \par Follow up in 3-4 months. \par

## 2021-08-30 ENCOUNTER — TRANSCRIPTION ENCOUNTER (OUTPATIENT)
Age: 61
End: 2021-08-30

## 2021-12-08 ENCOUNTER — APPOINTMENT (OUTPATIENT)
Dept: ENDOCRINOLOGY | Facility: CLINIC | Age: 61
End: 2021-12-08
Payer: COMMERCIAL

## 2021-12-08 VITALS
WEIGHT: 152 LBS | TEMPERATURE: 97.4 F | HEART RATE: 90 BPM | HEIGHT: 64 IN | BODY MASS INDEX: 25.95 KG/M2 | SYSTOLIC BLOOD PRESSURE: 152 MMHG | DIASTOLIC BLOOD PRESSURE: 96 MMHG

## 2021-12-08 VITALS — SYSTOLIC BLOOD PRESSURE: 137 MMHG | DIASTOLIC BLOOD PRESSURE: 97 MMHG

## 2021-12-08 PROCEDURE — 99214 OFFICE O/P EST MOD 30 MIN: CPT

## 2021-12-08 NOTE — REVIEW OF SYSTEMS
[Dysphagia] : no dysphagia [Dysphonia] : no dysphonia [Chest Pain] : no chest pain [Slow Heart Rate] : heart rate is not slow [Palpitations] : no palpitations [Fast Heart Rate] : heart rate is not fast [Shortness Of Breath] : no shortness of breath [Cough] : no cough [Nausea] : no nausea [Constipation] : no constipation [Vomiting] : no vomiting [Diarrhea] : no diarrhea [Headaches] : no headaches [Tremors] : no tremors [Depression] : no depression [Cold Intolerance] : no cold intolerance

## 2021-12-08 NOTE — PHYSICAL EXAM
[Alert] : alert [Well Nourished] : well nourished [No Acute Distress] : no acute distress [EOMI] : extra ocular movement intact [Normal Hearing] : hearing was normal [No Respiratory Distress] : no respiratory distress [No Accessory Muscle Use] : no accessory muscle use [Clear to Auscultation] : lungs were clear to auscultation bilaterally [Normal Bowel Sounds] : normal bowel sounds [Not Tender] : non-tender [Soft] : abdomen soft [No Motor Deficits] : the motor exam was normal [Normal Affect] : the affect was normal [Normal Insight/Judgement] : insight and judgment were intact [Normal Mood] : the mood was normal [Normal S1, S2] : normal S1 and S2 [Normal Rate] : heart rate was normal [Normal Gait] : normal gait [Normal Strength/Tone] : muscle strength and tone were normal [Foot Ulcers] : no foot ulcers

## 2021-12-08 NOTE — HISTORY OF PRESENT ILLNESS
[FreeTextEntry1] : Patient is a 60 yo man with uncontrolled type 2 diabetes, thyroid nodule here for diabetes follow up\par \par Type 2 diabetes diagnosed about 2010. States he needs to change diet drastically and started to do that recently.\par Was previously on metformin but had an allergic-like reaction. Metformin was stopped 6 years ago.\par He was on januvia 100 mg, glipizide 10 mg and farxiga 10 mg but based on an A1c of 8.8%, lantus 15 units was recommended in March 2021 but he never started the insulin, opting to modify diet instead. \par Currently adheres to glipizide, farxiga, januvia and metformin.  States he takes glipizide every other day and adheres to daily metformin\par SMBG every other day\par AM: 90-100s\par PM: 100s, highest value was 116\par No hypoglycemia\par Since the last visit, patient changed to plant based diet\par Breakfast: whole wheat bread, 1 banana; coffee with milk no sugar\par Lunch: salad from [employer], slice of bread. Used to eat plantain but stopped. Beans, chick peas and beans\par Dinner 5-6 PM: oatmeal\par Does not drink juice and no soda. If he drinks juice, it is home made from fresh fruit\par Reports that he has been snacking on a lot of cashews\par Attempts to adhere to a plant based diet\par Dilated eye exam: July 2021, no reported complications\par \par Thyroid nodule\par Right spongiform nodules measuring about 1.9 cm; left subcentimeter nodule April 2021\par No compressive symptoms\par Chemically and clinically euthyroid\par \par HLD: on statin\par \par HTN: Denies having BP in the past but was told recently pressure was high \par

## 2021-12-08 NOTE — ASSESSMENT
[FreeTextEntry1] : Patient is a 60 yo man with type 2 diabetes, bilateral thyroid nodules, HTN and HLD here for follow up\par \par 1. Type 2 diabetes\par -POCT A1c was 7.7% which is above goal.  Today he reports SMBG is around 90-100s, highest is 116, resolution of hypoglycemia\par -currently on 4 oral medicines, farxiga, glipizide, metformin and januvia. Tolerating all medicines without reported side effects. He has had some GI discomfort with metformin in the past but not reported at this time.\par -up to date with dilated eye exam\par -continue with consistent carbohydrate, sugar limited diet\par -A1c goal 7%\par -if frequent hypoglycemia to < 70, decrease glipizide. Patient will call.\par -check serum A1c\par -screen for nephropathy\par -check BMP\par \par 2. Thyroid nodule\par -right dominant nodule is 1.9 center\par -repeat thyroid US at this time, referral provided\par -chemically euthyroid\par -no compressive symptoms\par \par 3. HTN\par -BP goal < 140/90\par -BP elevated. He is going to his PCP Friday and will repeat the manual BP check.  He may require medicines for HTN that will be directed by PCP\par \par 4. HLD\par -LDL 90 March 2021\par -on statin\par \par Follow up in 3-4 months. \par

## 2021-12-09 LAB
ANION GAP SERPL CALC-SCNC: 11 MMOL/L
BUN SERPL-MCNC: 14 MG/DL
CALCIUM SERPL-MCNC: 10.4 MG/DL
CHLORIDE SERPL-SCNC: 103 MMOL/L
CHOLEST SERPL-MCNC: 135 MG/DL
CO2 SERPL-SCNC: 23 MMOL/L
CREAT SERPL-MCNC: 1.07 MG/DL
CREAT SPEC-SCNC: 51 MG/DL
ESTIMATED AVERAGE GLUCOSE: 166 MG/DL
GLUCOSE SERPL-MCNC: 144 MG/DL
HBA1C MFR BLD HPLC: 7.4 %
HDLC SERPL-MCNC: 31 MG/DL
LDLC SERPL CALC-MCNC: 66 MG/DL
MICROALBUMIN 24H UR DL<=1MG/L-MCNC: <1.2 MG/DL
MICROALBUMIN/CREAT 24H UR-RTO: NORMAL MG/G
NONHDLC SERPL-MCNC: 104 MG/DL
POTASSIUM SERPL-SCNC: 4.6 MMOL/L
SODIUM SERPL-SCNC: 137 MMOL/L
TRIGL SERPL-MCNC: 187 MG/DL

## 2021-12-27 ENCOUNTER — RX RENEWAL (OUTPATIENT)
Age: 61
End: 2021-12-27

## 2022-02-24 ENCOUNTER — OUTPATIENT (OUTPATIENT)
Dept: OUTPATIENT SERVICES | Facility: HOSPITAL | Age: 62
LOS: 1 days | End: 2022-02-24
Payer: COMMERCIAL

## 2022-02-24 ENCOUNTER — APPOINTMENT (OUTPATIENT)
Dept: ULTRASOUND IMAGING | Facility: HOSPITAL | Age: 62
End: 2022-02-24
Payer: COMMERCIAL

## 2022-02-24 PROCEDURE — 76536 US EXAM OF HEAD AND NECK: CPT

## 2022-02-24 PROCEDURE — 76536 US EXAM OF HEAD AND NECK: CPT | Mod: 26

## 2022-03-09 ENCOUNTER — NON-APPOINTMENT (OUTPATIENT)
Age: 62
End: 2022-03-09

## 2022-03-09 ENCOUNTER — APPOINTMENT (OUTPATIENT)
Dept: ENDOCRINOLOGY | Facility: CLINIC | Age: 62
End: 2022-03-09
Payer: COMMERCIAL

## 2022-03-09 VITALS
DIASTOLIC BLOOD PRESSURE: 89 MMHG | TEMPERATURE: 98.3 F | HEART RATE: 93 BPM | OXYGEN SATURATION: 98 % | WEIGHT: 150 LBS | SYSTOLIC BLOOD PRESSURE: 135 MMHG | HEIGHT: 65 IN | BODY MASS INDEX: 24.99 KG/M2

## 2022-03-09 LAB — HBA1C MFR BLD HPLC: 7.1

## 2022-03-09 PROCEDURE — 83036 HEMOGLOBIN GLYCOSYLATED A1C: CPT | Mod: QW

## 2022-03-09 PROCEDURE — 99214 OFFICE O/P EST MOD 30 MIN: CPT | Mod: 25

## 2022-03-09 NOTE — PHYSICAL EXAM
[Alert] : alert [Well Nourished] : well nourished [No Acute Distress] : no acute distress [EOMI] : extra ocular movement intact [Normal Hearing] : hearing was normal [No Respiratory Distress] : no respiratory distress [No Accessory Muscle Use] : no accessory muscle use [Clear to Auscultation] : lungs were clear to auscultation bilaterally [Normal S1, S2] : normal S1 and S2 [Normal Rate] : heart rate was normal [Normal Bowel Sounds] : normal bowel sounds [Not Tender] : non-tender [Soft] : abdomen soft [Normal Gait] : normal gait [Normal Strength/Tone] : muscle strength and tone were normal [Right foot was examined, including] : right foot ~C was examined, including visual inspection with sensory and pulse exams [Left foot was examined, including] : left foot ~C was examined, including visual inspection with sensory and pulse exams [No Motor Deficits] : the motor exam was normal [Normal Affect] : the affect was normal [Normal Insight/Judgement] : insight and judgment were intact [Normal Mood] : the mood was normal [Foot Ulcers] : no foot ulcers

## 2022-03-09 NOTE — HISTORY OF PRESENT ILLNESS
[FreeTextEntry1] : Patient is a 62 yo man with uncontrolled type 2 diabetes, thyroid nodule here for diabetes follow up\par \par Type 2 diabetes diagnosed about 2010. States he needs to change diet drastically and started to do that recently.\par Was previously on metformin but had an allergic-like reaction. Metformin was stopped 6 years ago.\par He was on januvia 100 mg, glipizide 10 mg and farxiga 10 mg but based on an A1c of 8.8%, lantus 15 units was recommended in March 2021 but he never started the insulin, opting to modify diet instead. \par Currently adheres to glipizide, farxiga, januvia and metformin 1000 mg BID. States he takes glipizide every other day and adheres to daily metformin\par SMBG every other day\par AM: 90-100s\par PM: 100s, highest value was 116\par No hypoglycemia\par Since the last visit, patient changed to plant based diet\par Breakfast: whole wheat bread, 1 banana; coffee with milk no sugar\par Lunch: salad from [employer], slice of bread. Used to eat plantain but stopped. Beans, chick peas and beans\par Dinner 5-6 PM: oatmeal\par Does not drink juice and no soda. If he drinks juice, it is home made from fresh fruit\par Reports that he has been snacking on a lot of cashews\par Dilated eye exam: July 2021, no reported complications\par \par Thyroid nodule\par Right spongiform nodules measuring about 1.9 cm; left subcentimeter nodule April 2021\par No compressive symptoms\par Chemically and clinically euthyroid\par Surveillance US completed in February 22 which confirms 1.7 cm nodule with ETE.  Referral for biopsy was provided last month. He knows to schedule but has not gotten around to it

## 2022-03-09 NOTE — REVIEW OF SYSTEMS
[Fatigue] : no fatigue [Decreased Appetite] : appetite not decreased [Visual Field Defect] : no visual field defect [Dysphonia] : no dysphonia [Dysphagia] : no dysphagia [Chest Pain] : no chest pain [Shortness Of Breath] : no shortness of breath [Nausea] : no nausea [Constipation] : no constipation [Vomiting] : no vomiting [Diarrhea] : no diarrhea [Headaches] : no headaches

## 2022-03-09 NOTE — ASSESSMENT
[Long Term Vascular Complications] : long term vascular complications of diabetes [Carbohydrate Consistent Diet] : carbohydrate consistent diet [Importance of Diet and Exercise] : importance of diet and exercise to improve glycemic control, achieve weight loss and improve cardiovascular health [Hypoglycemia Management] : hypoglycemia management [Self Monitoring of Blood Glucose] : self monitoring of blood glucose [Retinopathy Screening] : Patient was referred to ophthalmology for retinopathy screening [FreeTextEntry1] : Patient is a 60 yo man with type 2 diabetes, thyroid nodules, HTN and HLD here for follow up\par \par 1. Type 2 diabetes\par -POCT A1c today is 7.1% and at goal\par -Today he reports SMBG is around 90-100s, "mostly double digits."\par -no reported hypoglycemia\par -currently on 4 oral medicines, farxiga, glipizide, metformin and januvia. Tolerating all medicines without reported side effects. \par -up to date with dilated eye exam\par -continue with consistent carbohydrate, sugar limited diet\par -if frequent hypoglycemia to < 70, decrease glipizide. Patient will call.\par \par \par 2. Thyroid nodule\par -right dominant nodule is 1.9 center\par -biopsy of the right dominant nodule\par -chemically euthyroid\par -no compressive symptoms\par \par 3. HLD\par -statin\par \par Follow up in 4 months, sooner if needed\par

## 2022-03-24 ENCOUNTER — RX RENEWAL (OUTPATIENT)
Age: 62
End: 2022-03-24

## 2022-04-14 ENCOUNTER — RX RENEWAL (OUTPATIENT)
Age: 62
End: 2022-04-14

## 2022-06-24 RX ORDER — BLOOD SUGAR DIAGNOSTIC
STRIP MISCELLANEOUS
Qty: 3 | Refills: 3 | Status: ACTIVE | COMMUNITY
Start: 2021-07-01 | End: 1900-01-01

## 2022-06-29 ENCOUNTER — RX RENEWAL (OUTPATIENT)
Age: 62
End: 2022-06-29

## 2022-07-06 ENCOUNTER — APPOINTMENT (OUTPATIENT)
Dept: ENDOCRINOLOGY | Facility: CLINIC | Age: 62
End: 2022-07-06

## 2022-07-06 ENCOUNTER — RESULT REVIEW (OUTPATIENT)
Age: 62
End: 2022-07-06

## 2022-07-06 VITALS
BODY MASS INDEX: 24.66 KG/M2 | SYSTOLIC BLOOD PRESSURE: 128 MMHG | OXYGEN SATURATION: 99 % | HEART RATE: 86 BPM | DIASTOLIC BLOOD PRESSURE: 90 MMHG | HEIGHT: 65 IN | TEMPERATURE: 97.2 F | WEIGHT: 148 LBS

## 2022-07-06 LAB — HBA1C MFR BLD HPLC: 7.6

## 2022-07-06 PROCEDURE — 83036 HEMOGLOBIN GLYCOSYLATED A1C: CPT | Mod: QW

## 2022-07-06 PROCEDURE — 99214 OFFICE O/P EST MOD 30 MIN: CPT | Mod: 25

## 2022-07-06 RX ORDER — GLIPIZIDE 10 MG/1
10 TABLET, FILM COATED, EXTENDED RELEASE ORAL
Qty: 180 | Refills: 0 | Status: DISCONTINUED | COMMUNITY
Start: 2021-08-05 | End: 2022-07-06

## 2022-07-06 RX ORDER — GLIPIZIDE 10 MG/1
10 TABLET ORAL DAILY
Qty: 90 | Refills: 1 | Status: DISCONTINUED | COMMUNITY
End: 2022-07-06

## 2022-07-06 NOTE — REVIEW OF SYSTEMS
[Fatigue] : no fatigue [Decreased Appetite] : appetite not decreased [Dysphagia] : no dysphagia [Dysphonia] : no dysphonia [Nausea] : no nausea [Constipation] : no constipation [Vomiting] : no vomiting [Diarrhea] : no diarrhea [Polyuria] : no polyuria [Depression] : no depression [Anxiety] : no anxiety [Cold Intolerance] : no cold intolerance [Heat Intolerance] : no heat intolerance

## 2022-07-06 NOTE — ASSESSMENT
[FreeTextEntry1] : Patient is a 61 yo man with type 2 diabetes, thyroid nodules, HTN and HLD here for follow up\par \par 1. Type 2 diabetes\par -POCT A1c today is 7.6% and higher than our desired goal of 7%. He admits to increased carbohydrate intake including croissants, baked goods\par -Today he reports SMBG is around 90-100s, "mostly double digits."\par -reports one episode of hypoglycemia to 67\par -currently on 4 oral medicines with non adherence to januvia and glipizide.  Reports he consistently takes farxiga and metformin.  Denies significant side effects to medicines.  Due to hypoglycemia, can stop glipizide altogether. He was only take it about twice a week.  Recommend taking januvia 100 mg daily as opposed to once a week. \par -continue efforts to improve diet\par -up to date with dilated eye exam\par -continue with consistent carbohydrate, sugar limited diet\par -monofilament exam completed\par -check serum A1c, albumin/creatinine, BMP levels today\par \par 2. Thyroid nodule\par -right dominant nodule is 1.9 center\par -biopsy of the right dominant nodule; referrals have been provided in the past but he has yet to go.  Another referral provided today. Importance of diagnosis discussed\par -chemically euthyroid\par -no compressive symptoms\par \par 3. HLD\par -statin\par \par Follow up in 3 months, sooner if needed\par  [Diabetes Foot Care] : diabetes foot care [Long Term Vascular Complications] : long term vascular complications of diabetes [Carbohydrate Consistent Diet] : carbohydrate consistent diet [Importance of Diet and Exercise] : importance of diet and exercise to improve glycemic control, achieve weight loss and improve cardiovascular health [Exercise/Effect on Glucose] : exercise/effect on glucose [Hypoglycemia Management] : hypoglycemia management [Self Monitoring of Blood Glucose] : self monitoring of blood glucose [Retinopathy Screening] : Patient was referred to ophthalmology for retinopathy screening

## 2022-07-06 NOTE — PHYSICAL EXAM
[Alert] : alert [Well Nourished] : well nourished [No Acute Distress] : no acute distress [EOMI] : extra ocular movement intact [Normal Hearing] : hearing was normal [No Respiratory Distress] : no respiratory distress [No Accessory Muscle Use] : no accessory muscle use [Clear to Auscultation] : lungs were clear to auscultation bilaterally [Normal S1, S2] : normal S1 and S2 [Normal Rate] : heart rate was normal [Normal Bowel Sounds] : normal bowel sounds [Not Tender] : non-tender [Soft] : abdomen soft [Normal Gait] : normal gait [Normal Strength/Tone] : muscle strength and tone were normal [Right foot was examined, including] : right foot ~C was examined, including visual inspection with sensory and pulse exams [Left foot was examined, including] : left foot ~C was examined, including visual inspection with sensory and pulse exams [No Motor Deficits] : the motor exam was normal [Normal Affect] : the affect was normal [Normal Insight/Judgement] : insight and judgment were intact [Normal Mood] : the mood was normal [Foot Ulcers] : no foot ulcers [Swelling] : not swollen [Erythema] : not erythematous [Normal] : normal [2+] : 2+ in the dorsalis pedis [#1 Diminished] : number 1 was normal [#2 Diminished] : number 2 was normal [#3 Diminished] : number 3 was normal [#4 Diminished] : number 4 was normal [#5 Diminished] : number 5 was normal [#6 Diminished] : number 6 was normal [#7 Diminished] : number 7 was normal [#8 Diminished] : number 8 was normal [#9 Diminished] : number 9 was normal [#10 Diminished] : number 10 was normal

## 2022-07-06 NOTE — HISTORY OF PRESENT ILLNESS
[FreeTextEntry1] : Patient is a 63 yo man with uncontrolled type 2 diabetes, thyroid nodule here for diabetes follow up\par \par Type 2 diabetes diagnosed about 2010. States he needs to change diet drastically and started to do that earlier this year\par Was previously on metformin but had an allergic-like reaction. Metformin was stopped 6 years ago.\par He was on januvia 100 mg, glipizide 10 mg and farxiga 10 mg but based on an A1c of 8.8%, lantus 15 units was recommended in March 2021 but he never started the insulin, opting to modify diet instead. \par Currently adheres to glipizide, farxiga, januvia and metformin 1000 mg BID. Reports he mostly takes metformin and farixga.  Will take glipizide about twice a week and januvia about once a week\par SMBG every other day\par AM: 90-100s\par PM: 100s, highest value was 116\par Had value of 67 this week but previous to that not for 2-3 months.\par Breakfast: whole wheat bread, 1 banana; coffee with milk no sugar\par Lunch: salad from [employer], slice of bread. Used to eat plantain but stopped. Beans, chick peas and beans\par Dinner 5-6 PM: quinoa and vegetables\par Does not drink juice and no soda. If he drinks juice, it is home made from fresh fruit\par Reports that he has been snacking on a lot of cashews\par Dilated eye exam: June 2022, Dr Boby Mc in [facility] with no diabetes related complications\par \par Thyroid nodule\par Right spongiform nodules measuring about 1.9 cm; left subcentimeter nodule April 2021\par No compressive symptoms\par Chemically and clinically euthyroid\par Surveillance US completed in February 22 which confirms 1.7 cm nodule with ETE. Referral for biopsy was provided last month. He knows to schedule but has not gotten around to it \par Patient has yet to go for the biopsy

## 2022-07-07 LAB
ANION GAP SERPL CALC-SCNC: 12 MMOL/L
BUN SERPL-MCNC: 11 MG/DL
CALCIUM SERPL-MCNC: 9.6 MG/DL
CHLORIDE SERPL-SCNC: 102 MMOL/L
CHOLEST SERPL-MCNC: 137 MG/DL
CO2 SERPL-SCNC: 23 MMOL/L
CREAT SERPL-MCNC: 1.02 MG/DL
CREAT SPEC-SCNC: 137 MG/DL
EGFR: 84 ML/MIN/1.73M2
ESTIMATED AVERAGE GLUCOSE: 192 MG/DL
GLUCOSE SERPL-MCNC: 131 MG/DL
HBA1C MFR BLD HPLC: 8.3 %
HDLC SERPL-MCNC: 37 MG/DL
LDLC SERPL CALC-MCNC: 85 MG/DL
MICROALBUMIN 24H UR DL<=1MG/L-MCNC: <1.2 MG/DL
MICROALBUMIN/CREAT 24H UR-RTO: NORMAL MG/G
NONHDLC SERPL-MCNC: 100 MG/DL
POTASSIUM SERPL-SCNC: 4.5 MMOL/L
SODIUM SERPL-SCNC: 137 MMOL/L
T4 FREE SERPL-MCNC: 1.3 NG/DL
TRIGL SERPL-MCNC: 74 MG/DL
TSH SERPL-ACNC: 1.42 UIU/ML

## 2022-07-13 ENCOUNTER — OUTPATIENT (OUTPATIENT)
Dept: OUTPATIENT SERVICES | Facility: HOSPITAL | Age: 62
LOS: 1 days | End: 2022-07-13
Payer: COMMERCIAL

## 2022-07-13 ENCOUNTER — RX RENEWAL (OUTPATIENT)
Age: 62
End: 2022-07-13

## 2022-07-13 ENCOUNTER — APPOINTMENT (OUTPATIENT)
Dept: ULTRASOUND IMAGING | Facility: HOSPITAL | Age: 62
End: 2022-07-13

## 2022-07-13 ENCOUNTER — RESULT REVIEW (OUTPATIENT)
Age: 62
End: 2022-07-13

## 2022-07-13 PROCEDURE — 88173 CYTOPATH EVAL FNA REPORT: CPT

## 2022-07-13 PROCEDURE — 88173 CYTOPATH EVAL FNA REPORT: CPT | Mod: 26

## 2022-07-13 PROCEDURE — 10005 FNA BX W/US GDN 1ST LES: CPT

## 2022-07-13 PROCEDURE — 88305 TISSUE EXAM BY PATHOLOGIST: CPT

## 2022-07-13 PROCEDURE — 88305 TISSUE EXAM BY PATHOLOGIST: CPT | Mod: 26

## 2022-07-15 ENCOUNTER — NON-APPOINTMENT (OUTPATIENT)
Age: 62
End: 2022-07-15

## 2022-09-12 ENCOUNTER — RX RENEWAL (OUTPATIENT)
Age: 62
End: 2022-09-12

## 2022-10-11 ENCOUNTER — RX RENEWAL (OUTPATIENT)
Age: 62
End: 2022-10-11

## 2022-10-26 ENCOUNTER — APPOINTMENT (OUTPATIENT)
Dept: ENDOCRINOLOGY | Facility: CLINIC | Age: 62
End: 2022-10-26

## 2022-10-26 VITALS
TEMPERATURE: 97.1 F | BODY MASS INDEX: 25.49 KG/M2 | SYSTOLIC BLOOD PRESSURE: 142 MMHG | WEIGHT: 153 LBS | OXYGEN SATURATION: 97 % | HEIGHT: 65 IN | DIASTOLIC BLOOD PRESSURE: 92 MMHG | HEART RATE: 98 BPM

## 2022-10-26 LAB — HBA1C MFR BLD HPLC: 7.8

## 2022-10-26 PROCEDURE — 83036 HEMOGLOBIN GLYCOSYLATED A1C: CPT | Mod: QW

## 2022-10-26 PROCEDURE — 99215 OFFICE O/P EST HI 40 MIN: CPT | Mod: 25

## 2022-10-26 NOTE — REVIEW OF SYSTEMS
[Fatigue] : no fatigue [Decreased Appetite] : appetite not decreased [Visual Field Defect] : no visual field defect [Dysphagia] : no dysphagia [Dysphonia] : no dysphonia [Chest Pain] : no chest pain [Slow Heart Rate] : heart rate is not slow [Palpitations] : no palpitations [Fast Heart Rate] : heart rate is not fast [Shortness Of Breath] : no shortness of breath [Cough] : no cough [Headaches] : no headaches [Tremors] : no tremors [Depression] : no depression [Cold Intolerance] : no cold intolerance [Heat Intolerance] : no heat intolerance

## 2022-10-26 NOTE — HISTORY OF PRESENT ILLNESS
[FreeTextEntry1] : Patient is a 63 yo man with type 2 diabetes, thyroid nodule here for diabetes follow up\par \par Type 2 diabetes diagnosed around 2010.  Was previously on metformin but had an allergic-like reaction so it was stopped several years ago then restarted.\par He was on januvia 100 mg, glipizide 10 mg and farxiga 10 mg but based on an A1c of 8.8%, lantus 15 units was recommended in March 2021 but he never started the insulin, opting to modify diet instead. \par Reported non-adherence to oral medicines januvia and glipizide while consistently taking farxiga and metformin 1000 mg BID. A1c in July was 7.6% with reported non-adherence to consistent carbohydrate diet as well.  He was educated at that time to be consistent with medicines and glycemic goals were discussed\par At this time he is taking metformin 1000 mg BID, farxiga, januvia daily.  Takes glipizide every other day\par SMBG every other day\par Sometimes double digits and this morning value was 106\par Yesterday, he had a value of 67 around 8 pm.  Took glipizide at 3 PM after eating\par Diet: thinks there was a period of liberal diet but states that diet is somewhat better.  Cut back on rice, has whole wheat/rye not in excess.  No snacking on chips, no desserts. This morning he had a wheat muffin\par No soda and no juice\par Dilated eye exam: June 2022, Dr Boby Mc in [facility] with no diabetes related complications\par \par Thyroid nodule\par Right spongiform nodules measuring about 1.9 cm; left subcentimeter nodule April 2021\par No compressive symptoms\par Chemically and clinically euthyroid\par Surveillance US completed in February 22 confirmed 1.7 cm nodule with ETE.\par The nodule was biopsied July 2022 and was benign [place] Category II \par

## 2022-10-26 NOTE — ASSESSMENT
[FreeTextEntry1] : Patient is a 63 yo man with type 2 diabetes, thyroid nodule, HTN and HLD here for follow up\par \par 1. Type 2 diabetes\par -patient's POCT A1c today is 7.8%. While this value is better than the serum A1c from July of 8.3%, remains not at goal\par -patient ate a muffin today and when asked about food recall thinks chicken and nuts increase sugars without significant thought to carb/sugar.  I've offered him nutritional counseling but each time states he knows what to eat.  Does not bring meters with him to the visit\par -patient was offered insulin at prior visit but declines. He remains on four diabetes medicines/classes and escalation of therapy would be basal insulin\par -patient declined it, then towards the end of the visit states he might take it\par -educated that if he decides to take the insulin, he is to stop the glipizde and take levemir 10 units daily.  If he does not take the insulin, he can continue current regimen\par -risks of uncontrolled diabetes were discussed\par -encouraged the patient to check sugars more regularly at different times of the day and bring glucometer to next visit for review\par \par 2. Thyroid nodule\par -patient had biopsy of the right dominant nodule July 2022\par -the results were reviewed with the patient and embedded above\par -repeat thyroid US 2023\par \par 3. HTN\par -BP slightly above goal\par -follow up with PCP for optimization\par \par 4. HLD\par -statin\par \par Follow up in 3-4 months

## 2023-01-25 ENCOUNTER — APPOINTMENT (OUTPATIENT)
Dept: ENDOCRINOLOGY | Facility: CLINIC | Age: 63
End: 2023-01-25
Payer: COMMERCIAL

## 2023-01-25 VITALS
HEIGHT: 65 IN | SYSTOLIC BLOOD PRESSURE: 149 MMHG | DIASTOLIC BLOOD PRESSURE: 95 MMHG | OXYGEN SATURATION: 97 % | BODY MASS INDEX: 24.49 KG/M2 | TEMPERATURE: 97.2 F | WEIGHT: 147 LBS | HEART RATE: 107 BPM

## 2023-01-25 DIAGNOSIS — E78.5 HYPERLIPIDEMIA, UNSPECIFIED: ICD-10-CM

## 2023-01-25 DIAGNOSIS — I10 ESSENTIAL (PRIMARY) HYPERTENSION: ICD-10-CM

## 2023-01-25 LAB — HBA1C MFR BLD HPLC: 7.7

## 2023-01-25 PROCEDURE — 99214 OFFICE O/P EST MOD 30 MIN: CPT | Mod: 25

## 2023-01-25 PROCEDURE — 83036 HEMOGLOBIN GLYCOSYLATED A1C: CPT | Mod: QW

## 2023-01-25 RX ORDER — BLOOD-GLUCOSE METER
KIT MISCELLANEOUS
Qty: 1 | Refills: 3 | Status: ACTIVE | COMMUNITY
Start: 2021-03-26 | End: 1900-01-01

## 2023-01-25 RX ORDER — METFORMIN HYDROCHLORIDE 1000 MG/1
1000 TABLET, COATED ORAL TWICE DAILY
Qty: 180 | Refills: 0 | Status: DISCONTINUED | COMMUNITY
Start: 2021-08-19 | End: 2023-01-25

## 2023-01-25 RX ORDER — INSULIN DETEMIR 100 [IU]/ML
100 INJECTION, SOLUTION SUBCUTANEOUS
Qty: 4 | Refills: 0 | Status: DISCONTINUED | COMMUNITY
Start: 2022-10-26 | End: 2023-01-25

## 2023-01-25 NOTE — REVIEW OF SYSTEMS
[Fatigue] : no fatigue [Dysphagia] : no dysphagia [Dysphonia] : no dysphonia [Chest Pain] : no chest pain [Palpitations] : no palpitations [Nausea] : no nausea [Constipation] : no constipation [Diarrhea] : no diarrhea [Depression] : no depression

## 2023-01-25 NOTE — PHYSICAL EXAM
[Alert] : alert [Well Nourished] : well nourished [Healthy Appearance] : healthy appearance [No Acute Distress] : no acute distress [EOMI] : extra ocular movement intact [Normal Hearing] : hearing was normal [No Respiratory Distress] : no respiratory distress [No Accessory Muscle Use] : no accessory muscle use [Clear to Auscultation] : lungs were clear to auscultation bilaterally [Normal S1, S2] : normal S1 and S2 [Normal Rate] : heart rate was normal [Normal Bowel Sounds] : normal bowel sounds [Not Tender] : non-tender [Soft] : abdomen soft [Normal Gait] : normal gait [Normal Strength/Tone] : muscle strength and tone were normal [Foot Ulcers] : no foot ulcers [No Motor Deficits] : the motor exam was normal [Normal Affect] : the affect was normal [Normal Insight/Judgement] : insight and judgment were intact [Normal Mood] : the mood was normal

## 2023-01-25 NOTE — ASSESSMENT
[FreeTextEntry1] : Patient is a 63 yo man with type 2 diabetes, thyroid nodule, HTN and HLD here for follow up\par \par 1. Type 2 diabetes\par -patient's POCT A1c today is 7.7% and not significantly different from October. Goal A1c of 7% \par -since October, he reports improved adherence to medicine, less carbohydrates and consistent exercise\par -his glucometer was reviewed and he checks glucose levels once a day. Values are goal.  Educated that we may be missing post-prandial hyperglycemia. Encouraged the patient to check after eating\par -patient was offered insulin at prior visit but declines. Today, I think we can improve A1c with the addition of repaglinide before re-considering insulin as he has tightly controlled AM glucose levels\par -he needs to see a nutritionist. While he has declined in the past, this is essential to optimal diabetes management. He may be amenable to going so a referral was provided\par -risks of uncontrolled diabetes were discussed\par -add repaglinide 0.5 with meals as this in combination with sitagliptin may improve postprandial hyperglycemia. Continue with metformin 1000 mg BID and farxiga.  Actos was offered instead of repaglinide but he prefers repaglinide based on potential side effect profile\par \par 2. Thyroid nodule\par -patient had biopsy of the right dominant nodule July 2022\par -repeat thyroid US now; referral given\par \par 3. HTN\par -BP and HR remain elevated\par -encourage patient to follow up with PCP to address\par \par Follow up in 4 months\par

## 2023-01-26 LAB
ANION GAP SERPL CALC-SCNC: 17 MMOL/L
BUN SERPL-MCNC: 13 MG/DL
CALCIUM SERPL-MCNC: 10.3 MG/DL
CHLORIDE SERPL-SCNC: 99 MMOL/L
CHOLEST SERPL-MCNC: 160 MG/DL
CO2 SERPL-SCNC: 24 MMOL/L
CREAT SERPL-MCNC: 1.16 MG/DL
CREAT SPEC-SCNC: 108 MG/DL
EGFR: 72 ML/MIN/1.73M2
ESTIMATED AVERAGE GLUCOSE: 177 MG/DL
GLUCOSE SERPL-MCNC: 142 MG/DL
HBA1C MFR BLD HPLC: 7.8 %
HDLC SERPL-MCNC: 35 MG/DL
LDLC SERPL CALC-MCNC: 105 MG/DL
MICROALBUMIN 24H UR DL<=1MG/L-MCNC: 1.3 MG/DL
MICROALBUMIN/CREAT 24H UR-RTO: 12 MG/G
NONHDLC SERPL-MCNC: 125 MG/DL
POTASSIUM SERPL-SCNC: 4.7 MMOL/L
SODIUM SERPL-SCNC: 140 MMOL/L
T4 FREE SERPL-MCNC: 1.4 NG/DL
TRIGL SERPL-MCNC: 100 MG/DL
TSH SERPL-ACNC: 1.69 UIU/ML

## 2023-03-02 ENCOUNTER — OUTPATIENT (OUTPATIENT)
Dept: OUTPATIENT SERVICES | Facility: HOSPITAL | Age: 63
LOS: 1 days | End: 2023-03-02
Payer: COMMERCIAL

## 2023-03-02 ENCOUNTER — APPOINTMENT (OUTPATIENT)
Dept: ULTRASOUND IMAGING | Facility: HOSPITAL | Age: 63
End: 2023-03-02
Payer: COMMERCIAL

## 2023-03-02 ENCOUNTER — TRANSCRIPTION ENCOUNTER (OUTPATIENT)
Age: 63
End: 2023-03-02

## 2023-03-02 PROCEDURE — 76536 US EXAM OF HEAD AND NECK: CPT

## 2023-03-02 PROCEDURE — 76536 US EXAM OF HEAD AND NECK: CPT | Mod: 26

## 2023-03-02 RX ORDER — ATORVASTATIN CALCIUM 10 MG/1
10 TABLET, FILM COATED ORAL
Qty: 90 | Refills: 3 | Status: ACTIVE | COMMUNITY
Start: 2022-04-14 | End: 1900-01-01

## 2023-03-03 ENCOUNTER — NON-APPOINTMENT (OUTPATIENT)
Age: 63
End: 2023-03-03

## 2023-04-07 ENCOUNTER — RX RENEWAL (OUTPATIENT)
Age: 63
End: 2023-04-07

## 2023-05-19 ENCOUNTER — TRANSCRIPTION ENCOUNTER (OUTPATIENT)
Age: 63
End: 2023-05-19

## 2023-05-22 RX ORDER — LANCETS 33 GAUGE
EACH MISCELLANEOUS
Qty: 100 | Refills: 0 | Status: ACTIVE | COMMUNITY
Start: 2023-05-22 | End: 1900-01-01

## 2023-07-06 ENCOUNTER — APPOINTMENT (OUTPATIENT)
Dept: ENDOCRINOLOGY | Facility: CLINIC | Age: 63
End: 2023-07-06

## 2024-05-23 ENCOUNTER — APPOINTMENT (OUTPATIENT)
Dept: ENDOCRINOLOGY | Facility: CLINIC | Age: 64
End: 2024-05-23
Payer: COMMERCIAL

## 2024-05-23 DIAGNOSIS — E11.9 TYPE 2 DIABETES MELLITUS W/OUT COMPLICATIONS: ICD-10-CM

## 2024-05-23 DIAGNOSIS — E04.1 NONTOXIC SINGLE THYROID NODULE: ICD-10-CM

## 2024-05-23 PROCEDURE — 99214 OFFICE O/P EST MOD 30 MIN: CPT

## 2024-05-23 RX ORDER — SITAGLIPTIN 100 MG/1
100 TABLET, FILM COATED ORAL
Qty: 90 | Refills: 0 | Status: DISCONTINUED | COMMUNITY
Start: 2022-06-29 | End: 2024-05-23

## 2024-05-23 NOTE — HISTORY OF PRESENT ILLNESS
[FreeTextEntry1] : Patient is a 65 yo man with type 2 diabetes, thyroid nodule here for diabetes follow up. Last seen January 2022  Type 2 diabetes diagnosed around 2010. Was previously on metformin but had an allergic-like reaction so it was stopped several years ago then restarted. He was on januvia 100 mg, glipizide 10 mg and farxiga 10 mg but based on an A1c of 8.8%, lantus 15 units was recommended in March 2021. Adherence to medicine has been low.  He ran out of insulin about two months ago and did not request refills from PCP. He says insulin was as needed 180 will take lantus 3-4 units.  He discontinued januvia on his own At this time he is taking metformin 1000 mg BID, farxiga 10 mg daily SMBG every other day: "roller coaster." This morning glucose was 67 and yesterday he had a value of 136.  He states the low was due to not eating much yesterday.   He reports A1c two months ago was "8 point something."   Diet: he will eat vegetables, quinoa, avoids rice; will have chicken breast. No soda and no juice Dilated eye exam: 4 months ago, Dr Boby Mc in [facility] with no diabetes related complications  Thyroid nodule Right spongiform nodules measuring about 1.9 cm; left subcentimeter nodule April 2021 No compressive symptoms Chemically and clinically euthyroid Surveillance US completed in February 22 confirmed 1.7 cm nodule with ETE. The nodule was biopsied July 2022 and was benign [place] Category II

## 2024-05-23 NOTE — PHYSICAL EXAM
[Alert] : alert [No Acute Distress] : no acute distress [No Respiratory Distress] : no respiratory distress [Clear to Auscultation] : lungs were clear to auscultation bilaterally [Normal Rate] : heart rate was normal [Normal Bowel Sounds] : normal bowel sounds [Soft] : abdomen soft [Normal Affect] : the affect was normal [Normal Mood] : the mood was normal

## 2024-05-23 NOTE — ASSESSMENT
[Long Term Vascular Complications] : long term vascular complications of diabetes [Carbohydrate Consistent Diet] : carbohydrate consistent diet [Importance of Diet and Exercise] : importance of diet and exercise to improve glycemic control, achieve weight loss and improve cardiovascular health [Self Monitoring of Blood Glucose] : self monitoring of blood glucose [Retinopathy Screening] : Patient was referred to ophthalmology for retinopathy screening [FreeTextEntry1] : Patient is a 63 yo man with type 2 diabetes, thyroid nodule, HTN and HLD here for follow up  1. Type 2 diabetes -last visit was over 1 year ago. There are no MA"s at this time, patient arrived late to visit, unable to do POCT A1c. Recent labs were done by PCP, requested he fax it over.  A1c is "8 point something" - A1c of 7% -he reports adherence to pills but self discontinued januvia and ran out of lantus 2 months ago.  He was in [place]. -patient states diet is unchanged, focuses on vegetables, limits starches/carbohydrates -risks of uncontrolled diabetes were discussed -continue metformin and farxiga at current doses.  He was only taking lantus 3-4 units if glucose was > 180.  At those low doses, not needed to restart. We will add repaglinide 1 with meals  2. Thyroid nodule -patient had biopsy of the right dominant nodule July 2022 -repeat thyroid US referral given in the past but he has not completed imaging -new referral provided today  Follow up in 3 months

## 2024-06-19 RX ORDER — METFORMIN HYDROCHLORIDE 1000 MG/1
1000 TABLET, COATED ORAL TWICE DAILY
Qty: 1 | Refills: 3 | Status: DISCONTINUED | COMMUNITY
Start: 2023-01-25 | End: 2024-06-19

## 2024-06-19 RX ORDER — REPAGLINIDE 2 MG/1
2 TABLET ORAL 3 TIMES DAILY
Qty: 270 | Refills: 3 | Status: ACTIVE | COMMUNITY
Start: 2023-01-25 | End: 1900-01-01

## 2024-06-20 ENCOUNTER — TRANSCRIPTION ENCOUNTER (OUTPATIENT)
Age: 64
End: 2024-06-20

## 2024-07-08 ENCOUNTER — OUTPATIENT (OUTPATIENT)
Dept: OUTPATIENT SERVICES | Facility: HOSPITAL | Age: 64
LOS: 1 days | End: 2024-07-08

## 2024-07-08 ENCOUNTER — APPOINTMENT (OUTPATIENT)
Dept: ULTRASOUND IMAGING | Facility: HOSPITAL | Age: 64
End: 2024-07-08
Payer: COMMERCIAL

## 2024-07-08 PROCEDURE — 76536 US EXAM OF HEAD AND NECK: CPT

## 2024-07-08 PROCEDURE — 76536 US EXAM OF HEAD AND NECK: CPT | Mod: 26

## 2024-08-22 ENCOUNTER — APPOINTMENT (OUTPATIENT)
Dept: ENDOCRINOLOGY | Facility: CLINIC | Age: 64
End: 2024-08-22
Payer: COMMERCIAL

## 2024-08-22 VITALS
BODY MASS INDEX: 24.16 KG/M2 | SYSTOLIC BLOOD PRESSURE: 134 MMHG | TEMPERATURE: 97.2 F | HEART RATE: 82 BPM | OXYGEN SATURATION: 99 % | WEIGHT: 145 LBS | DIASTOLIC BLOOD PRESSURE: 89 MMHG | HEIGHT: 65 IN

## 2024-08-22 DIAGNOSIS — I10 ESSENTIAL (PRIMARY) HYPERTENSION: ICD-10-CM

## 2024-08-22 DIAGNOSIS — E04.1 NONTOXIC SINGLE THYROID NODULE: ICD-10-CM

## 2024-08-22 DIAGNOSIS — E11.9 TYPE 2 DIABETES MELLITUS W/OUT COMPLICATIONS: ICD-10-CM

## 2024-08-22 DIAGNOSIS — E78.5 HYPERLIPIDEMIA, UNSPECIFIED: ICD-10-CM

## 2024-08-22 LAB — HBA1C MFR BLD HPLC: 8.4

## 2024-08-22 PROCEDURE — 36415 COLL VENOUS BLD VENIPUNCTURE: CPT

## 2024-08-22 PROCEDURE — G2211 COMPLEX E/M VISIT ADD ON: CPT | Mod: NC

## 2024-08-22 PROCEDURE — 99214 OFFICE O/P EST MOD 30 MIN: CPT

## 2024-08-22 PROCEDURE — 83036 HEMOGLOBIN GLYCOSYLATED A1C: CPT | Mod: QW

## 2024-08-22 RX ORDER — SITAGLIPTIN AND METFORMIN HYDROCHLORIDE 50; 1000 MG/1; MG/1
50-1000 TABLET, FILM COATED, EXTENDED RELEASE ORAL
Qty: 180 | Refills: 3 | Status: ACTIVE | COMMUNITY
Start: 2024-08-22 | End: 1900-01-01

## 2024-08-22 NOTE — ASSESSMENT
[FreeTextEntry1] : Patient is a 65 yo man with type 2 diabetes, thyroid nodule, HTN and HLD here for follow up  1. Type 2 diabetes -POCT A1c today is 8.4% and remains above goal -he reports adherence to current diabetes medicines -on food recall, diet remains somewhat high in starches/carbohydrates.  Recommend green vegetables, lean protein, fruits in moderation -risks of uncontrolled diabetes were discussed -continue repaglinide 2 mg TID and farxiga at current doses. Stop the metformin and start Janumet XR  BID -dilated exam up to date -check CMP, serum A1c, albumin/creatinine levels today  2. Thyroid nodule -patient had biopsy of the right dominant nodule July 2022-benign -he is up to date with thyroid US for 2024 -requires annual surveillance -check TSH  3. HTN -BP stable  4. HLD -statin -check lipid proflie -LDL goal < 70  Patient is aware that I'm leaving the practice.  He will need new endocrinology care going forward.

## 2024-08-22 NOTE — REVIEW OF SYSTEMS
[Fatigue] : no fatigue [Chest Pain] : no chest pain [Slow Heart Rate] : heart rate is not slow [Palpitations] : no palpitations [Fast Heart Rate] : heart rate is not fast [Shortness Of Breath] : no shortness of breath [Cough] : no cough [Nausea] : no nausea [Constipation] : no constipation [Vomiting] : no vomiting [Diarrhea] : no diarrhea [FreeTextEntry2] : "I feel great"

## 2024-08-22 NOTE — PHYSICAL EXAM
[Alert] : alert [No Acute Distress] : no acute distress [No Respiratory Distress] : no respiratory distress [Clear to Auscultation] : lungs were clear to auscultation bilaterally [Normal S1, S2] : normal S1 and S2 [Normal Rate] : heart rate was normal [Normal Bowel Sounds] : normal bowel sounds [Not Tender] : non-tender [Normal Affect] : the affect was normal [Normal Mood] : the mood was normal [Normal Hearing] : hearing was normal [Normal Gait] : normal gait

## 2024-08-22 NOTE — HISTORY OF PRESENT ILLNESS
[FreeTextEntry1] : Patient is a 63 yo man with type 2 diabetes, thyroid nodule here for follow up.  Type 2 diabetes diagnosed around 2010. Was previously on metformin but had an allergic-like reaction so it was stopped several years ago then restarted. He was on januvia 100 mg, glipizide 10 mg and farxiga 10 mg but based on an A1c of 8.8%, lantus 15 units was recommended in March 2021. Adherence to medicine has been low. He ran out of insulin about two months ago and did not request refills from PCP. He says insulin was as needed 180 will take lantus 3-4 units. He discontinued januvia on his own At this time he is taking metformin 1000 mg BID, farxiga 10 mg daily. Repaglinide 1 mg TID with meals was added last visit May 2024 Checks sugars irregularly: reports values in the 90-130s.  This morning's glucose was 92.  Diet: quinoa, legumes, avoids red meat; eats chicken breast No soda and no juice Dilated eye exam: last week Dr Boby Mc in [facility] with no diabetes related complications He saw a podiatrist  Thyroid nodule Right spongiform nodules measuring about 1.9 cm; left subcentimeter nodule April 2021 No compressive symptoms Chemically and clinically euthyroid Surveillance US completed in February 22 confirmed 1.7 cm nodule with ETE. The nodule was biopsied July 2022 and was benign Norman Category II

## 2024-08-23 LAB
ALBUMIN SERPL ELPH-MCNC: 4.8 G/DL
ALP BLD-CCNC: 46 U/L
ALT SERPL-CCNC: 26 U/L
ANION GAP SERPL CALC-SCNC: 13 MMOL/L
AST SERPL-CCNC: 19 U/L
BILIRUB SERPL-MCNC: 0.5 MG/DL
BUN SERPL-MCNC: 13 MG/DL
CALCIUM SERPL-MCNC: 10.2 MG/DL
CHLORIDE SERPL-SCNC: 98 MMOL/L
CO2 SERPL-SCNC: 27 MMOL/L
CREAT SERPL-MCNC: 1.09 MG/DL
CREAT SPEC-SCNC: 47 MG/DL
EGFR: 77 ML/MIN/1.73M2
ESTIMATED AVERAGE GLUCOSE: 226 MG/DL
GLUCOSE SERPL-MCNC: 210 MG/DL
HBA1C MFR BLD HPLC: 9.5 %
MICROALBUMIN 24H UR DL<=1MG/L-MCNC: <1.2 MG/DL
MICROALBUMIN/CREAT 24H UR-RTO: NORMAL MG/G
POTASSIUM SERPL-SCNC: 4.9 MMOL/L
PROT SERPL-MCNC: 7.6 G/DL
SODIUM SERPL-SCNC: 137 MMOL/L
T4 FREE SERPL-MCNC: 1.4 NG/DL
TSH SERPL-ACNC: 1.36 UIU/ML

## 2024-08-23 RX ORDER — PEN NEEDLE, DIABETIC 32GX 5/32"
32G X 4 MM NEEDLE, DISPOSABLE MISCELLANEOUS
Qty: 3 | Refills: 1 | Status: ACTIVE | COMMUNITY
Start: 2024-08-23 | End: 1900-01-01

## 2024-08-23 RX ORDER — BLOOD-GLUCOSE,RECEIVER,CONT
EACH MISCELLANEOUS
Qty: 1 | Refills: 0 | Status: ACTIVE | COMMUNITY
Start: 2024-08-23 | End: 1900-01-01

## 2024-08-23 RX ORDER — INSULIN GLARGINE 100 [IU]/ML
100 INJECTION, SOLUTION SUBCUTANEOUS DAILY
Qty: 4 | Refills: 0 | Status: ACTIVE | COMMUNITY
Start: 2024-08-23 | End: 1900-01-01

## 2024-08-23 RX ORDER — BLOOD-GLUCOSE SENSOR
EACH MISCELLANEOUS
Qty: 2 | Refills: 3 | Status: ACTIVE | COMMUNITY
Start: 2024-08-23 | End: 1900-01-01
